# Patient Record
Sex: FEMALE | Race: BLACK OR AFRICAN AMERICAN | NOT HISPANIC OR LATINO | Employment: PART TIME | URBAN - METROPOLITAN AREA
[De-identification: names, ages, dates, MRNs, and addresses within clinical notes are randomized per-mention and may not be internally consistent; named-entity substitution may affect disease eponyms.]

---

## 2018-10-18 ENCOUNTER — HOSPITAL ENCOUNTER (EMERGENCY)
Facility: HOSPITAL | Age: 43
Discharge: HOME | End: 2018-10-18
Attending: EMERGENCY MEDICINE
Payer: COMMERCIAL

## 2018-10-18 VITALS
RESPIRATION RATE: 18 BRPM | OXYGEN SATURATION: 100 % | HEART RATE: 79 BPM | BODY MASS INDEX: 36.35 KG/M2 | TEMPERATURE: 98 F | SYSTOLIC BLOOD PRESSURE: 154 MMHG | DIASTOLIC BLOOD PRESSURE: 84 MMHG | HEIGHT: 66 IN | WEIGHT: 226.19 LBS

## 2018-10-18 DIAGNOSIS — S13.4XXA WHIPLASH INJURIES, INITIAL ENCOUNTER: ICD-10-CM

## 2018-10-18 DIAGNOSIS — G44.209 TENSION HEADACHE: ICD-10-CM

## 2018-10-18 DIAGNOSIS — S46.812A STRAIN OF LEFT TRAPEZIUS MUSCLE, INITIAL ENCOUNTER: ICD-10-CM
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
## 2018-10-18 PROCEDURE — 25000003 PHARM REV CODE 250: Performed by: NURSE PRACTITIONER

## 2018-10-18 PROCEDURE — 99284 EMERGENCY DEPT VISIT MOD MDM: CPT

## 2018-10-18 RX ORDER — ORPHENADRINE CITRATE 100 MG/1
100 TABLET, EXTENDED RELEASE ORAL 2 TIMES DAILY PRN
Qty: 20 TABLET | Refills: 0 | Status: SHIPPED | OUTPATIENT
Start: 2018-10-18 | End: 2018-10-28

## 2018-10-18 RX ORDER — METHOCARBAMOL 500 MG/1
500 TABLET, FILM COATED ORAL
Status: COMPLETED | OUTPATIENT
Start: 2018-10-18 | End: 2018-10-18

## 2018-10-18 RX ORDER — NAPROXEN 500 MG/1
500 TABLET ORAL
Status: COMPLETED | OUTPATIENT
Start: 2018-10-18 | End: 2018-10-18

## 2018-10-18 RX ORDER — BUTALBITAL, ACETAMINOPHEN AND CAFFEINE 50; 325; 40 MG/1; MG/1; MG/1
1 TABLET ORAL EVERY 4 HOURS PRN
Qty: 20 TABLET | Refills: 0 | Status: SHIPPED | OUTPATIENT
Start: 2018-10-18 | End: 2018-11-17

## 2018-10-18 RX ADMIN — METHOCARBAMOL 500 MG: 500 TABLET ORAL at 09:10

## 2018-10-18 RX ADMIN — NAPROXEN 500 MG: 500 TABLET ORAL at 09:10
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
## 2024-03-01 ENCOUNTER — HOSPITAL ENCOUNTER (EMERGENCY)
Facility: HOSPITAL | Age: 49
Discharge: HOME | End: 2024-03-01
Attending: EMERGENCY MEDICINE
Payer: COMMERCIAL

## 2024-03-01 VITALS
RESPIRATION RATE: 17 BRPM | BODY MASS INDEX: 34.87 KG/M2 | OXYGEN SATURATION: 97 % | HEART RATE: 62 BPM | SYSTOLIC BLOOD PRESSURE: 181 MMHG | DIASTOLIC BLOOD PRESSURE: 97 MMHG | TEMPERATURE: 99 F | WEIGHT: 216.06 LBS
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
## 2024-03-01 DIAGNOSIS — S39.012A BACK STRAIN, INITIAL ENCOUNTER: ICD-10-CM

## 2024-03-01 DIAGNOSIS — S16.1XXA STRAIN OF NECK MUSCLE, INITIAL ENCOUNTER: ICD-10-CM

## 2024-03-01 DIAGNOSIS — R03.0 ELEVATED BLOOD PRESSURE READING: ICD-10-CM

## 2024-03-01 PROCEDURE — 99284 EMERGENCY DEPT VISIT MOD MDM: CPT

## 2024-03-01 PROCEDURE — 25000003 PHARM REV CODE 250: Performed by: NURSE PRACTITIONER

## 2024-03-01 RX ORDER — METHOCARBAMOL 500 MG/1
500 TABLET, FILM COATED ORAL 3 TIMES DAILY PRN
Qty: 20 TABLET | Refills: 0 | Status: SHIPPED | OUTPATIENT
Start: 2024-03-01 | End: 2024-03-06

## 2024-03-01 RX ORDER — CLONIDINE HYDROCHLORIDE 0.1 MG/1
0.1 TABLET ORAL
Status: COMPLETED | OUTPATIENT
Start: 2024-03-01 | End: 2024-03-01

## 2024-03-01 RX ORDER — BUTALBITAL, ACETAMINOPHEN AND CAFFEINE 50; 325; 40 MG/1; MG/1; MG/1
1 TABLET ORAL
Status: COMPLETED | OUTPATIENT
Start: 2024-03-01 | End: 2024-03-01

## 2024-03-01 RX ORDER — METHOCARBAMOL 500 MG/1
500 TABLET, FILM COATED ORAL
Status: COMPLETED | OUTPATIENT
Start: 2024-03-01 | End: 2024-03-01

## 2024-03-01 RX ORDER — HYDROCHLOROTHIAZIDE 25 MG/1
25 TABLET ORAL
Status: COMPLETED | OUTPATIENT
Start: 2024-03-01 | End: 2024-03-01

## 2024-03-01 RX ORDER — BUTALBITAL, ACETAMINOPHEN AND CAFFEINE 50; 325; 40 MG/1; MG/1; MG/1
1 TABLET ORAL EVERY 4 HOURS PRN
Qty: 20 TABLET | Refills: 0 | Status: SHIPPED | OUTPATIENT
Start: 2024-03-01 | End: 2024-03-31

## 2024-03-01 RX ADMIN — HYDROCHLOROTHIAZIDE 25 MG: 25 TABLET ORAL at 09:03

## 2024-03-01 RX ADMIN — CLONIDINE HYDROCHLORIDE 0.1 MG: 0.1 TABLET ORAL at 09:03

## 2024-03-01 RX ADMIN — METHOCARBAMOL 500 MG: 500 TABLET ORAL at 09:03

## 2024-03-01 RX ADMIN — BUTALBITAL, ACETAMINOPHEN, AND CAFFEINE 1 TABLET: 325; 50; 40 TABLET ORAL at 09:03
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
